# Patient Record
Sex: FEMALE | Race: BLACK OR AFRICAN AMERICAN | Employment: FULL TIME | URBAN - METROPOLITAN AREA
[De-identification: names, ages, dates, MRNs, and addresses within clinical notes are randomized per-mention and may not be internally consistent; named-entity substitution may affect disease eponyms.]

---

## 2021-04-20 ENCOUNTER — RECORDS - HEALTHEAST (OUTPATIENT)
Dept: LAB | Facility: CLINIC | Age: 31
End: 2021-04-20

## 2021-04-20 LAB
SARS-COV-2 PCR COMMENT: NORMAL
SARS-COV-2 RNA SPEC QL NAA+PROBE: NEGATIVE
SARS-COV-2 VIRUS SPECIMEN SOURCE: NORMAL

## 2021-04-29 ENCOUNTER — RECORDS - HEALTHEAST (OUTPATIENT)
Dept: LAB | Facility: CLINIC | Age: 31
End: 2021-04-29

## 2021-05-04 ENCOUNTER — OFFICE VISIT (OUTPATIENT)
Dept: FAMILY MEDICINE | Facility: CLINIC | Age: 31
End: 2021-05-04
Payer: COMMERCIAL

## 2021-05-04 VITALS
TEMPERATURE: 98.3 F | BODY MASS INDEX: 32.39 KG/M2 | DIASTOLIC BLOOD PRESSURE: 74 MMHG | HEIGHT: 62 IN | SYSTOLIC BLOOD PRESSURE: 118 MMHG | WEIGHT: 176 LBS | HEART RATE: 76 BPM | OXYGEN SATURATION: 100 %

## 2021-05-04 DIAGNOSIS — E66.9 EXCESSIVE SUBCUTANEOUS FAT: ICD-10-CM

## 2021-05-04 DIAGNOSIS — D50.8 IRON DEFICIENCY ANEMIA SECONDARY TO INADEQUATE DIETARY IRON INTAKE: ICD-10-CM

## 2021-05-04 DIAGNOSIS — Z01.818 PREOP GENERAL PHYSICAL EXAM: Primary | ICD-10-CM

## 2021-05-04 LAB
ALBUMIN UR-MCNC: NEGATIVE MG/DL
ANION GAP SERPL CALCULATED.3IONS-SCNC: 3 MMOL/L (ref 3–14)
APPEARANCE UR: CLEAR
APTT PPP: 35 SEC (ref 22–37)
BACTERIA #/AREA URNS HPF: ABNORMAL /HPF
BASOPHILS # BLD AUTO: 0.1 10E9/L (ref 0–0.2)
BASOPHILS NFR BLD AUTO: 1 %
BILIRUB UR QL STRIP: NEGATIVE
BUN SERPL-MCNC: 10 MG/DL (ref 7–30)
CALCIUM SERPL-MCNC: 8.7 MG/DL (ref 8.5–10.1)
CHLORIDE SERPL-SCNC: 105 MMOL/L (ref 94–109)
CO2 SERPL-SCNC: 28 MMOL/L (ref 20–32)
COLOR UR AUTO: YELLOW
CREAT SERPL-MCNC: 0.76 MG/DL (ref 0.52–1.04)
DACRYOCYTES BLD QL SMEAR: SLIGHT
DIFFERENTIAL METHOD BLD: ABNORMAL
ERYTHROCYTE [DISTWIDTH] IN BLOOD BY AUTOMATED COUNT: 28.9 % (ref 10–15)
GFR SERPL CREATININE-BSD FRML MDRD: >90 ML/MIN/{1.73_M2}
GLUCOSE SERPL-MCNC: 90 MG/DL (ref 70–99)
GLUCOSE UR STRIP-MCNC: NEGATIVE MG/DL
HCG UR QL: NEGATIVE
HCT VFR BLD AUTO: 28.8 % (ref 35–47)
HGB BLD-MCNC: 9.5 G/DL (ref 11.7–15.7)
HGB UR QL STRIP: NEGATIVE
HYPOCHROMIA BLD QL: PRESENT
INR PPP: 1.07 (ref 0.86–1.14)
KETONES UR STRIP-MCNC: NEGATIVE MG/DL
LEUKOCYTE ESTERASE UR QL STRIP: NEGATIVE
LYMPHOCYTES # BLD AUTO: 2.9 10E9/L (ref 0.8–5.3)
LYMPHOCYTES NFR BLD AUTO: 36 %
MACROCYTES BLD QL SMEAR: PRESENT
MCH RBC QN AUTO: 18.7 PG (ref 26.5–33)
MCHC RBC AUTO-ENTMCNC: 33 G/DL (ref 31.5–36.5)
MCV RBC AUTO: 57 FL (ref 78–100)
MICROCYTES BLD QL SMEAR: PRESENT
MONOCYTES # BLD AUTO: 0.3 10E9/L (ref 0–1.3)
MONOCYTES NFR BLD AUTO: 4 %
NEUTROPHILS # BLD AUTO: 4.7 10E9/L (ref 1.6–8.3)
NEUTROPHILS NFR BLD AUTO: 59 %
NITRATE UR QL: NEGATIVE
NON-SQ EPI CELLS #/AREA URNS LPF: ABNORMAL /LPF
NRBC # BLD AUTO: 0.2 10*3/UL
NRBC BLD AUTO-RTO: 2 /100
OVALOCYTES BLD QL SMEAR: SLIGHT
PH UR STRIP: 6 PH (ref 5–7)
PLATELET # BLD AUTO: 245 10E9/L (ref 150–450)
PLATELET # BLD EST: ABNORMAL 10*3/UL
POLYCHROMASIA BLD QL SMEAR: ABNORMAL
POTASSIUM SERPL-SCNC: 3.7 MMOL/L (ref 3.4–5.3)
RBC # BLD AUTO: 5.09 10E12/L (ref 3.8–5.2)
RBC #/AREA URNS AUTO: ABNORMAL /HPF
RBC INCLUSIONS BLD: SLIGHT
SODIUM SERPL-SCNC: 136 MMOL/L (ref 133–144)
SOURCE: ABNORMAL
SP GR UR STRIP: 1.02 (ref 1–1.03)
TARGETS BLD QL SMEAR: ABNORMAL
UROBILINOGEN UR STRIP-ACNC: 1 EU/DL (ref 0.2–1)
WBC # BLD AUTO: 8 10E9/L (ref 4–11)
WBC #/AREA URNS AUTO: ABNORMAL /HPF

## 2021-05-04 PROCEDURE — 87389 HIV-1 AG W/HIV-1&-2 AB AG IA: CPT | Performed by: PHYSICIAN ASSISTANT

## 2021-05-04 PROCEDURE — 36415 COLL VENOUS BLD VENIPUNCTURE: CPT | Performed by: PHYSICIAN ASSISTANT

## 2021-05-04 PROCEDURE — 85730 THROMBOPLASTIN TIME PARTIAL: CPT | Performed by: PHYSICIAN ASSISTANT

## 2021-05-04 PROCEDURE — 81025 URINE PREGNANCY TEST: CPT | Performed by: PHYSICIAN ASSISTANT

## 2021-05-04 PROCEDURE — 80048 BASIC METABOLIC PNL TOTAL CA: CPT | Performed by: PHYSICIAN ASSISTANT

## 2021-05-04 PROCEDURE — 85610 PROTHROMBIN TIME: CPT | Performed by: PHYSICIAN ASSISTANT

## 2021-05-04 PROCEDURE — 93000 ELECTROCARDIOGRAM COMPLETE: CPT | Performed by: PHYSICIAN ASSISTANT

## 2021-05-04 PROCEDURE — 85025 COMPLETE CBC W/AUTO DIFF WBC: CPT | Performed by: PHYSICIAN ASSISTANT

## 2021-05-04 PROCEDURE — 81001 URINALYSIS AUTO W/SCOPE: CPT | Performed by: PHYSICIAN ASSISTANT

## 2021-05-04 PROCEDURE — 99204 OFFICE O/P NEW MOD 45 MIN: CPT | Performed by: PHYSICIAN ASSISTANT

## 2021-05-04 ASSESSMENT — PAIN SCALES - GENERAL: PAINLEVEL: NO PAIN (0)

## 2021-05-04 ASSESSMENT — MIFFLIN-ST. JEOR: SCORE: 1471.58

## 2021-05-04 NOTE — Clinical Note
Dr Ramos, could you co-sign this.  I will clear the patient only if she can improve Hgb above 10 before 5/28/21. Patient is having an elective liposuction in [place].     Thank you   Katina Mendoza PA-C

## 2021-05-04 NOTE — PATIENT INSTRUCTIONS
At [facility], we strive to deliver an exceptional experience to you, every time we see you. If you receive a survey, please complete it as we do value your feedback.  If you have MyChart, you can expect to receive results automatically within 24 hours of their completion.  Your provider will send a note interpreting your results as well.   If you do not have MyChart, you should receive your results in about a week by mail.    Your care team:                            Family Medicine Internal Medicine   MD Bobby Salcedo MD Shantel Branch-Fleming, MD Srinivasa Vaka, MD Katya Belousova, PAJAVIER Ibrahim, APRN CNP    Kwaku Ramos, MD Pediatrics   Marvin Hatch, PAJAVIER Burgos, CNP MD Miley Vides APRN CNP   MD Deborah Stinson MD Deborah Mielke, MD Emily Mcgraw, APRN [facility]      Clinic hours: Monday - Thursday 7 am-6 pm; Fridays 7 am-5 pm.   Urgent care: Monday - Friday 10 am- 8 pm; Saturday and Sunday 9 am-5 pm.    Clinic: [phone]       [facility]: Monday - Thursday 8 am - 7 pm; Friday 8 am - 6 pm  [facility]: [phone]     Use www.[email or web address].org for 24/7 diagnosis and treatment of dozens of conditions.  Preparing for Your Surgery  Getting started  A nurse will call you to review your health history and instructions. They will give you an arrival time based on your scheduled surgery time.  Please be ready to share the following:    Your doctor's clinic name and phone number    Your medical, surgical and anesthesia history    A list of allergies and sensitivities    A list of medicines, including herbal treatments and over-the-counter drugs    Whether the patient has a legal guardian (ask how to send us the papers in advance)  If you have a child who's having surgery, please ask for a copy of Preparing for Your Child's Surgery.    Preparing for  surgery    Within 30 days of surgery: Have a pre-op exam (sometimes called an H&P, or History and Physical). This can be done at a clinic or pre-operative center.  ? If you're having a , you may not need this exam. Talk to your care team    At your pre-op exam, talk to your care team about all medicines you take. If you need to stop any medicines before surgery, ask when to start taking them again.  ? We do this for your safety. Many medicines can make you bleed too much during surgery. Some change how well surgery (anesthesia) drugs work.    Call your insurance company to let them know you're having surgery. (If you don't have insurance, call [phone].)    Call your clinic if there's any change in your health. This includes signs of a cold or flu (sore throat, runny nose, cough, rash, fever). It also includes a scrape or scratch near the surgery site.    If you have questions on the day of surgery, call your hospital or surgery center.  Eating and drinking guidelines  For your safety: Unless your surgeon tells you otherwise, follow the guidelines below.    Eat and drink as usual until 8 hours before surgery. After that, no food or milk.    Drink clear liquids until 2 hours before surgery. These are liquids you can see through, like water, Gatorade and Propel Water. You may also have black coffee and tea (no cream or milk).    Nothing by mouth within 2 hours of surgery. This includes gum, candy and breath mints.    If you drink, stop drinking alcohol the night before surgery.    If your care team tells you to take medicine on the morning of surgery, it's okay to take it with a sip of water.  Preventing infection    Shower or bathe the night before and morning of your surgery. Follow the instructions your clinic gave you. (If no instructions, use regular soap.)    Don't shave or clip hair near your surgery site. We'll remove the hair if needed.    Don't smoke or vape the morning of surgery. You may chew  nicotine gum up to 2 hours before surgery. A nicotine patch is okay.  ? Note: Some surgeries require you to completely quit smoking and nicotine. Check with your surgeon.    Your care team will make every effort to keep you safe from infection. We will:  ? Clean our hands often with soap and water (or an alcohol-based hand rub).  ? Clean the skin at your surgery site with a special soap that kills germs.  ? Give you a special gown to keep you warm. (Cold raises the risk of infection.)  ? Wear special hair covers, masks, gowns and gloves during surgery.  ? Give antibiotic medicine, if prescribed. Not all surgeries need antibiotics.  What to bring on the day of surgery    Photo ID and insurance card    Copy of your health care directive, if you have one    Glasses and hearing aides (bring cases)  ? You can't wear contacts during surgery    Inhaler and eye drops, if you use them (tell us about these when you arrive)    CPAP machine or breathing device, if you use them    A few personal items, if spending the night    If you have . . .  ? A pacemaker or ICD (cardiac defibrillator): Bring the ID card.  ? An implanted stimulator: Bring the remote control.  ? A legal guardian: Bring a copy of the certified (court-stamped) guardianship papers.  Please remove any jewelry, including body piercings. Leave jewelry and other valuables at home.  If you're going home the day of surgery  Important: If you don't follow the rules below, we must cancel your surgery.     Arrange for someone to drive you home after surgery. You may not drive, take a taxi or take public transportation by yourself (unless you'll have local anesthesia only).    Arrange for a responsible adult to stay with you overnight. If you don't, we may keep you in the hospital overnight, and you may need to pay the costs yourself.  Questions?   If you have any questions for your care team, list them here:  _________________________________________________________________________________________________________________________________________________________________________________________________________________________________________________________________________________________________________________________  For informational purposes only. Not to replace the advice of your health care provider. Copyright   2003, 2019 [facility]. All rights reserved. Clinically reviewed by Kim Brown MD. SMARTworks 160127 - REV 4/20.

## 2021-05-04 NOTE — Clinical Note
Magi, can you fax the pre-op again? Patient called and states that plastic surgery clinic does not have her pre-op, lab and EKG    Thank you     Katina Mendoza PA-C

## 2021-05-04 NOTE — PROGRESS NOTES
[address] [address]  Phone: [phone]  Primary Provider: Geovanna Ref-Primary, Physician  Pre-op Performing Provider: JAYLEN TANNER      PREOPERATIVE EVALUATION:  Today's date: 5/4/2021    Sandra Chavez is a 31 year old female who presents for a preoperative evaluation.    Surgical Information:  Surgery/Procedure: Lipo 360  Surgery Location: [facility], [place], [place]  Surgeon: Dr. Ruddy Lind,   Surgery Date: 6/4/21  Time of Surgery: TBD  Where patient plans to recover: At home with family  Fax number for surgical facility: [phone]    Type of Anesthesia Anticipated: General    Assessment & Plan     The proposed surgical procedure is considered INTERMEDIATE risk.    Problem List Items Addressed This Visit        Hematologic    Iron deficiency anemia secondary to inadequate dietary iron intake    Relevant Medications    ferrous sulfate (FEROSUL) 325 (65 Fe) MG tablet    Other Relevant Orders    CBC with platelets (Completed)      Other Visit Diagnoses     Preop general physical exam    -  Primary    Relevant Orders    Basic metabolic panel  (Ca, Cl, CO2, Creat, Gluc, K, Na, BUN) (Completed)    HCG Qual, Urine ([id number]) (Completed)    HIV Antigen Antibody Combo (Completed)    INR (Completed)    Partial thromboplastin time (Completed)    UA with Microscopic (Completed)    EKG 12-lead complete w/read - Clinics    CBC with platelets and differential (Completed)    Excessive subcutaneous fat               Risks and Recommendations:  The patient has the following additional risks and recommendations for perioperative complications:   - No identified additional risk factors other than previously addressed    Medication Instructions:  Patient is on no chronic medications        RECOMMENDATION:  Surgery is NOT recommended due to moderate worsening anemia that must be corrected prior to elective surgery. Anemia treatment plan:  Iron 325 mg three times a day for 1-2 months until HGB is back to normal ranges.     25 minutes spent on the date of the encounter doing chart review, history and exam, documentation and further activities per the note        Subjective     HPI related to upcoming procedure: liposuction of the abdomen, and flanks       Preop Questions 5/4/2021   1. Have you ever had a heart attack or stroke? No   2. Have you ever had surgery on your heart or blood vessels, such as a stent placement, a coronary artery bypass, or surgery on an artery in your head, neck, heart, or legs? No   3. Do you have chest pain with activity? No   4. Do you have a history of  heart failure? No   5. Do you currently have a cold, bronchitis or symptoms of other infection? No   6. Do you have a cough, shortness of breath, or wheezing? No   7. Do you or anyone in your family have previous history of blood clots? No   8. Do you or does anyone in your family have a serious bleeding problem such as prolonged bleeding following surgeries or cuts? No   9. Have you ever had problems with anemia or been told to take iron pills? No   10. Have you had any abnormal blood loss such as black, tarry or bloody stools, or abnormal vaginal bleeding? No   11. Have you ever had a blood transfusion? No   12. Are you willing to have a blood transfusion if it is medically needed before, during, or after your surgery? Yes   13. Have you or any of your relatives ever had problems with anesthesia? No   14. Do you have sleep apnea, excessive snoring or daytime drowsiness? No   15. Do you have any artifical heart valves or other implanted medical devices like a pacemaker, defibrillator, or continuous glucose monitor? No   16. Do you have artificial joints? No   17. Are you allergic to latex? No   18. Is there any chance that you may be pregnant? No     Health Care Directive:  Patient does not have a Health Care Directive or Living Will: Discussed advance care planning with  "patient; however, patient declined at this time. Full code    Preoperative Review of :   reviewed - no record of controlled substances prescribed.      Status of Chronic Conditions:  See problem list for active medical problems.  Problems all longstanding and stable, except as noted/documented.  See ROS for pertinent symptoms related to these conditions.      Review of Systems  Constitutional, neuro, ENT, endocrine, pulmonary, cardiac, gastrointestinal, genitourinary, musculoskeletal, integument and psychiatric systems are negative, except as otherwise noted.    Patient Active Problem List    Diagnosis Date Noted     Iron deficiency anemia secondary to inadequate dietary iron intake 05/06/2021     Priority: Medium      History reviewed. No pertinent past medical history.  History reviewed. No pertinent surgical history.  Current Outpatient Medications   Medication Sig Dispense Refill     ferrous sulfate (FEROSUL) 325 (65 Fe) MG tablet Take 1 tablet (325 mg) by mouth 3 times daily (with meals) 180 tablet 1     levonorgestrel (MIRENA) 20 MCG/24HR IUD 1 each by Intrauterine route once         No Known Allergies     Social History     Tobacco Use     Smoking status: Never Smoker     Smokeless tobacco: Never Used   Substance Use Topics     Alcohol use: Not on file     History reviewed. No pertinent family history.  History   Drug Use Unknown         Objective     /74 (BP Location: Left arm, Patient Position: Sitting, Cuff Size: Adult Regular)   Pulse 76   Temp 98.3  F (36.8  C) (Tympanic)   Ht 1.575 m (5' 2\")   Wt 79.8 kg (176 lb)   LMP 04/10/2021 (Exact Date)   SpO2 100%   BMI 32.19 kg/m      Physical Exam    GENERAL APPEARANCE: healthy, alert and no distress     EYES: EOMI, PERRL     HENT: ear canals and TM's normal and nose and mouth without ulcers or lesions     NECK: no adenopathy, no asymmetry, masses, or scars and thyroid normal to palpation     RESP: lungs clear to auscultation - no rales, " rhonchi or wheezes     CV: regular rates and rhythm, normal S1 S2, no S3 or S4 and no murmur, click or rub     ABDOMEN:  soft, nontender, no HSM or masses and bowel sounds normal     MS: extremities normal- no gross deformities noted, no evidence of inflammation in joints, FROM in all extremities.     SKIN: no suspicious lesions or rashes     NEURO: Normal strength and tone, sensory exam grossly normal, mentation intact and speech normal     PSYCH: mentation appears normal. and affect normal/bright     LYMPHATICS: No cervical adenopathy    No results for input(s): HGB, PLT, INR, NA, POTASSIUM, CR, A1C in the last 57710 hours.     Diagnostics:  Diagnostic Test Results:  Results for orders placed or performed in visit on 05/04/21   Basic metabolic panel  (Ca, Cl, CO2, Creat, Gluc, K, Na, BUN)     Status: None   Result Value Ref Range    Sodium 136 133 - 144 mmol/L    Potassium 3.7 3.4 - 5.3 mmol/L    Chloride 105 94 - 109 mmol/L    Carbon Dioxide 28 20 - 32 mmol/L    Anion Gap 3 3 - 14 mmol/L    Glucose 90 70 - 99 mg/dL    Urea Nitrogen 10 7 - 30 mg/dL    Creatinine 0.76 0.52 - 1.04 mg/dL    GFR Estimate >90 >60 mL/min/[1.73_m2]    GFR Estimate If Black >90 >60 mL/min/[1.73_m2]    Calcium 8.7 8.5 - 10.1 mg/dL   HCG Qual, Urine ([id number])     Status: None   Result Value Ref Range    HCG Qual Urine Negative NEG^Negative   HIV Antigen Antibody Combo     Status: None   Result Value Ref Range    HIV Antigen Antibody Combo Nonreactive NR^Nonreactive       INR     Status: None   Result Value Ref Range    INR 1.07 0.86 - 1.14   Partial thromboplastin time     Status: None   Result Value Ref Range    PTT 35 22 - 37 sec   UA with Microscopic     Status: Abnormal   Result Value Ref Range    Color Urine Yellow     Appearance Urine Clear     Glucose Urine Negative NEG^Negative mg/dL    Bilirubin Urine Negative NEG^Negative    Ketones Urine Negative NEG^Negative mg/dL    Specific Gravity Urine 1.025 1.003 - 1.035    pH Urine 6.0 5.0  - 7.0 pH    Protein Albumin Urine Negative NEG^Negative mg/dL    Urobilinogen Urine 1.0 0.2 - 1.0 EU/dL    Nitrite Urine Negative NEG^Negative    Blood Urine Negative NEG^Negative    Leukocyte Esterase Urine Negative NEG^Negative    Source Midstream Urine     WBC Urine 0 - 5 OTO5^0 - 5 /HPF    RBC Urine O - 2 OTO2^O - 2 /HPF    Squamous Epithelial /LPF Urine Few FEW^Few /LPF    Bacteria Urine Moderate (A) NEG^Negative /HPF   CBC with platelets and differential     Status: Abnormal   Result Value Ref Range    WBC 8.0 4.0 - 11.0 10e9/L    RBC Count 5.09 3.8 - 5.2 10e12/L    Hemoglobin 9.5 (L) 11.7 - 15.7 g/dL    Hematocrit 28.8 (L) 35.0 - 47.0 %    MCV 57 (L) 78 - 100 fl    MCH 18.7 (L) 26.5 - 33.0 pg    MCHC 33.0 31.5 - 36.5 g/dL    RDW 28.9 (H) 10.0 - 15.0 %    Platelet Count 245 150 - 450 10e9/L    % Neutrophils 59.0 %    % Lymphocytes 36.0 %    % Monocytes 4.0 %    % Basophils 1.0 %    Nucleated RBCs 2 (H) 0 /100    Absolute Neutrophil 4.7 1.6 - 8.3 10e9/L    Absolute Lymphocytes 2.9 0.8 - 5.3 10e9/L    Absolute Monocytes 0.3 0.0 - 1.3 10e9/L    Absolute Basophils 0.1 0.0 - 0.2 10e9/L    Absolute Nucleated RBC 0.2     Polychromasia Slight Increase     RBC Fragments Slight     Teardrop Cells Slight     Target Cells Moderate     Ovalocytes Slight     Microcytes Present     Macrocytes Present     Hypochromasia Present     Platelet Estimate       Automated count confirmed.  Platelet morphology is normal.    Diff Method Manual Differential       CBC : 05/28/2021  Lab Results   Component Value Date    WBC 9.9 05/28/2021     Lab Results   Component Value Date    RBC 4.89 05/28/2021     Lab Results   Component Value Date    HGB 9.3 05/28/2021     Lab Results   Component Value Date    HCT 27.7 05/28/2021     No components found for: MCT  Lab Results   Component Value Date    MCV 57 05/28/2021     Lab Results   Component Value Date    MCH 19.0 05/28/2021     Lab Results   Component Value Date    MCHC 33.6 05/28/2021     Lab  Results   Component Value Date    RDW 29.5 05/28/2021     Lab Results   Component Value Date     05/28/2021       EKG: appears normal, NSR, normal axis, normal intervals, no acute ST/T changes c/w ischemia, no LVH by voltage criteria, unchanged from previous tracings    Revised Cardiac Risk Index (RCRI):  The patient has the following serious cardiovascular risks for perioperative complications:   - No serious cardiac risks = 0 points     RCRI Interpretation: 0 points: Class I (very low risk - 0.4% complication rate)           Signed Electronically by: Katina Mendoza PA-C  Reviewed and agree with assessment and plan above.  Kwaku Ramos MD    Copy of this evaluation report is provided to requesting physician.

## 2021-05-04 NOTE — Clinical Note
Dr. Galvez, please co-sign this pre-op.  Im clearing the patient only she can get HGB above 10 before May 28th, otherwise she would have to postpone the surgery    Thank you     Katina Mendoza PA-C

## 2021-05-05 LAB — HIV 1+2 AB+HIV1 P24 AG SERPL QL IA: NONREACTIVE

## 2021-05-06 ENCOUNTER — RECORDS - HEALTHEAST (OUTPATIENT)
Dept: LAB | Facility: CLINIC | Age: 31
End: 2021-05-06

## 2021-05-06 PROBLEM — D50.8 IRON DEFICIENCY ANEMIA SECONDARY TO INADEQUATE DIETARY IRON INTAKE: Status: ACTIVE | Noted: 2021-05-06

## 2021-05-06 RX ORDER — FERROUS SULFATE 325(65) MG
325 TABLET ORAL
Qty: 180 TABLET | Refills: 1 | Status: SHIPPED | OUTPATIENT
Start: 2021-05-06 | End: 2022-02-23

## 2021-05-10 ENCOUNTER — RECORDS - HEALTHEAST (OUTPATIENT)
Dept: LAB | Facility: CLINIC | Age: 31
End: 2021-05-10

## 2021-05-13 ENCOUNTER — TELEPHONE (OUTPATIENT)
Dept: FAMILY MEDICINE | Facility: CLINIC | Age: 31
End: 2021-05-13

## 2021-05-13 ENCOUNTER — RECORDS - HEALTHEAST (OUTPATIENT)
Dept: LAB | Facility: CLINIC | Age: 31
End: 2021-05-13

## 2021-05-13 ENCOUNTER — MYC MEDICAL ADVICE (OUTPATIENT)
Dept: FAMILY MEDICINE | Facility: CLINIC | Age: 31
End: 2021-05-13

## 2021-05-13 ENCOUNTER — VIRTUAL VISIT (OUTPATIENT)
Dept: FAMILY MEDICINE | Facility: CLINIC | Age: 31
End: 2021-05-13
Payer: COMMERCIAL

## 2021-05-13 DIAGNOSIS — Z53.9 ERRONEOUS ENCOUNTER--DISREGARD: Primary | ICD-10-CM

## 2021-05-13 NOTE — TELEPHONE ENCOUNTER
Please fax patient preop labs to facility ASAP, labs done on 5/4/21. [facility], [place], [place], fax: [phone]. Patient would also like a copy left at the  for her to . Em Crews -

## 2021-05-13 NOTE — PROGRESS NOTES
"Patient called to ask if pre-op was faxed. Her plastic surgery clinic in [place] did not get it yet.   Patient also wants to get prescription  for sone \"urination pills\" her plastic surgeon told her that she will need to urinate a lot after the surgery.     Pre-op was faxed on 5/6/21. I will ask to fax it again patient needs to call [place] and confirm the fax number.   Any medication that is needed for after the surgery a plastic surgeon should prescribe. Patient will follow up with them    Katina Mendoza PA-C   "

## 2021-05-13 NOTE — TELEPHONE ENCOUNTER
Pre-op, lab and EKG printed and faxed for the 2nd time for patient.  Elias Brannon,  For [facility]

## 2021-05-13 NOTE — PROGRESS NOTES
Pre-op, labs and EKG is printed and faxed for the 2nd time.  Elias Brannon,  For [facility]

## 2021-05-13 NOTE — TELEPHONE ENCOUNTER
Pre-op, lab and EKG printed and faxed again for the 2nd time, copy is put at the  for patient to pickup this after noon.  Elias Brannon,  For [facility]    Sent my chart message to patient to pickup a copy today after noon.  Elias Brannon,  For [facility]

## 2021-05-18 ENCOUNTER — RECORDS - HEALTHEAST (OUTPATIENT)
Dept: LAB | Facility: CLINIC | Age: 31
End: 2021-05-18

## 2021-05-25 ENCOUNTER — RECORDS - HEALTHEAST (OUTPATIENT)
Dept: LAB | Facility: CLINIC | Age: 31
End: 2021-05-25

## 2021-05-28 DIAGNOSIS — D50.8 IRON DEFICIENCY ANEMIA SECONDARY TO INADEQUATE DIETARY IRON INTAKE: ICD-10-CM

## 2021-05-28 LAB
ERYTHROCYTE [DISTWIDTH] IN BLOOD BY AUTOMATED COUNT: 29.5 % (ref 10–15)
HCT VFR BLD AUTO: 27.7 % (ref 35–47)
HGB BLD-MCNC: 9.3 G/DL (ref 11.7–15.7)
MCH RBC QN AUTO: 19 PG (ref 26.5–33)
MCHC RBC AUTO-ENTMCNC: 33.6 G/DL (ref 31.5–36.5)
MCV RBC AUTO: 57 FL (ref 78–100)
PLATELET # BLD AUTO: 339 10E9/L (ref 150–450)
RBC # BLD AUTO: 4.89 10E12/L (ref 3.8–5.2)
WBC # BLD AUTO: 9.9 10E9/L (ref 4–11)

## 2021-05-28 PROCEDURE — 36415 COLL VENOUS BLD VENIPUNCTURE: CPT | Performed by: PHYSICIAN ASSISTANT

## 2021-05-28 PROCEDURE — 85027 COMPLETE CBC AUTOMATED: CPT | Performed by: PHYSICIAN ASSISTANT

## 2021-06-01 ENCOUNTER — TELEPHONE (OUTPATIENT)
Dept: FAMILY MEDICINE | Facility: CLINIC | Age: 31
End: 2021-06-01

## 2021-06-01 NOTE — TELEPHONE ENCOUNTER
Patient is looking for the results of the 5/28/21 labwork.  She states needs the results and needs them sent to [facility] [place], [place], [place] at Fax [phone].  Has surgery scheduled for 6/4/21.  She states had to have the labwork redone because her iron was too low.  Provider has not yet reviewed.  Sending this encounter to provider to advise please.  Emily Feliciano RN

## 2021-06-01 NOTE — TELEPHONE ENCOUNTER
Provider:   1. Please review and advise regarding final lab results.   2. Please route back to  to fax results to fax # [phone] with plan.    Pt is calling back again stating that the [facility] still needs the results, but if her Hgb is less than 11 she believes her surgery will be cancelled. Pt inquires if her Hgb is less than 11. RN advised pt that her Hgb is 9.3, but still awaiting provider to review and advise regarding the results. RN advised there is no plan from provider documented on the result notes so pt will be notified of all results and plan when reviewed by provider. Pt agrees and will await call back.    CESIA AbadN, RN

## 2021-06-02 NOTE — TELEPHONE ENCOUNTER
Provider: She reports the she has a Mirena and does not have heavy periods.  She is taking the iron 325 mg 3 times a day.  When and how would you like her to follow up on the Hgb?  Thank you. Kaia Gonzalez R.N.    This refill/encounter is being handled by a team outside your facility.  If action needs to be taken, please route the encounter back to your team that would normally handle it. Please do not send directly back to the sender. Thank you. Kaia Gonzalez R.N.  Patient notified of provider's message as written below and she will cancel her surgery. She reports the she has a Mirena and does not have heavy periods.  She is taking the iron 325 mg 3 times a day.   Ok to leave a message with the provider's response.

## 2021-06-02 NOTE — TELEPHONE ENCOUNTER
Left message on voicemail for Sandra letting her know her lab result were abnormal; provider cannot ok her for her surgery at this time.  Left call back number for patient to call with rest of instructions.  [phone]/  Emily TIWARI RN    When patient returns call please relay this message from her provider:  Please call the patient. The HGB has gotten worse. Its possible that she bleeds heavily during period. Also has she been taking iron pills three times a day as was advised. I will not clear her for cosmetic surgery until HGB is back to normal ranges.  If patient has more questions, she may schedule a phone visit.   Thank you      Katina Mendoza PA-C

## 2021-06-02 NOTE — PROGRESS NOTES
Faxed updated pre-op information per Katina.  Elias Brannon (AKA:  Magi), , [facility], Primary Care

## 2021-06-02 NOTE — TELEPHONE ENCOUNTER
Please call the patient. The HGB has gotten worse. Its possible that she bleeds heavily during period. Also has she need taking iron pills three times a day as was advised. I will not clear her for cosmetic surgery until HGB is back to normal ranges.  If patient has more questions, she may schedule a phone visit.   Thank you     Katina Mendoza PA-C

## 2021-06-03 ENCOUNTER — RECORDS - HEALTHEAST (OUTPATIENT)
Dept: LAB | Facility: CLINIC | Age: 31
End: 2021-06-03

## 2021-06-03 NOTE — TELEPHONE ENCOUNTER
I have talked to the patient yesterday and she did not start iron immediately and does not take it regularly. I already instructed her to take three times a day every single day with food.     Repeat lab in 3 weeks.     Katina Mendoza PA-C

## 2021-06-03 NOTE — TELEPHONE ENCOUNTER
Gave message as written below, scheduled lab appointment 3 weeks    Luba Mitchell RN, BSN, CMSRN  [facility]

## 2021-06-08 ENCOUNTER — RECORDS - HEALTHEAST (OUTPATIENT)
Dept: LAB | Facility: CLINIC | Age: 31
End: 2021-06-08

## 2021-06-08 ENCOUNTER — TELEPHONE (OUTPATIENT)
Dept: FAMILY MEDICINE | Facility: CLINIC | Age: 31
End: 2021-06-08

## 2021-06-15 ENCOUNTER — OFFICE VISIT (OUTPATIENT)
Dept: FAMILY MEDICINE | Facility: CLINIC | Age: 31
End: 2021-06-15
Payer: COMMERCIAL

## 2021-06-15 ENCOUNTER — RECORDS - HEALTHEAST (OUTPATIENT)
Dept: LAB | Facility: CLINIC | Age: 31
End: 2021-06-15

## 2021-06-15 VITALS
WEIGHT: 179.2 LBS | SYSTOLIC BLOOD PRESSURE: 112 MMHG | HEART RATE: 92 BPM | DIASTOLIC BLOOD PRESSURE: 75 MMHG | TEMPERATURE: 98 F | OXYGEN SATURATION: 100 % | BODY MASS INDEX: 32.78 KG/M2

## 2021-06-15 DIAGNOSIS — Z11.59 NEED FOR HEPATITIS C SCREENING TEST: ICD-10-CM

## 2021-06-15 DIAGNOSIS — Z00.00 ROUTINE HISTORY AND PHYSICAL EXAMINATION OF ADULT: Primary | ICD-10-CM

## 2021-06-15 DIAGNOSIS — D50.8 IRON DEFICIENCY ANEMIA SECONDARY TO INADEQUATE DIETARY IRON INTAKE: ICD-10-CM

## 2021-06-15 DIAGNOSIS — Z12.4 SCREENING FOR CERVICAL CANCER: ICD-10-CM

## 2021-06-15 DIAGNOSIS — Z11.3 SCREEN FOR STD (SEXUALLY TRANSMITTED DISEASE): ICD-10-CM

## 2021-06-15 DIAGNOSIS — T83.32XA INTRAUTERINE CONTRACEPTIVE DEVICE THREADS LOST, INITIAL ENCOUNTER: ICD-10-CM

## 2021-06-15 LAB
SARS-COV-2 PCR COMMENT: NORMAL
SARS-COV-2 RNA SPEC QL NAA+PROBE: NEGATIVE
SARS-COV-2 VIRUS SPECIMEN SOURCE: NORMAL
SPECIMEN SOURCE: NORMAL
WET PREP SPEC: NORMAL

## 2021-06-15 PROCEDURE — 99213 OFFICE O/P EST LOW 20 MIN: CPT | Mod: 25 | Performed by: NURSE PRACTITIONER

## 2021-06-15 PROCEDURE — 87624 HPV HI-RISK TYP POOLED RSLT: CPT | Performed by: NURSE PRACTITIONER

## 2021-06-15 PROCEDURE — 87491 CHLMYD TRACH DNA AMP PROBE: CPT | Performed by: NURSE PRACTITIONER

## 2021-06-15 PROCEDURE — 87591 N.GONORRHOEAE DNA AMP PROB: CPT | Performed by: NURSE PRACTITIONER

## 2021-06-15 PROCEDURE — 87210 SMEAR WET MOUNT SALINE/INK: CPT | Performed by: NURSE PRACTITIONER

## 2021-06-15 PROCEDURE — 99395 PREV VISIT EST AGE 18-39: CPT | Performed by: NURSE PRACTITIONER

## 2021-06-15 PROCEDURE — G0145 SCR C/V CYTO,THINLAYER,RESCR: HCPCS | Performed by: NURSE PRACTITIONER

## 2021-06-15 ASSESSMENT — PAIN SCALES - GENERAL: PAINLEVEL: NO PAIN (0)

## 2021-06-15 NOTE — PROGRESS NOTES
"    Assessment & Plan     Routine history and physical examination of adult      Screening for cervical cancer    - HPV High Risk Types DNA Cervical  - Pap imaged thin layer screen with HPV - recommended age 30 - 65 years (select HPV order below)    Intrauterine contraceptive device threads lost, initial encounter  Referring to GYN for removal of IUD.  - OB/GYN REFERRAL    Iron deficiency anemia secondary to inadequate dietary iron intake  Hgb 9.3 5/28/21, advised her to continue to take her iron supplement, recheck  CBC and iron as scheduled    Need for hepatitis C screening test    - Hepatitis C Screen Reflex to HCV RNA Quant and Genotype    Screen for STD (sexually transmitted disease)  Always use condoms to help prevent STI's.  - Wet prep  - NEISSERIA GONORRHOEA PCR  - CHLAMYDIA TRACHOMATIS PCR      }     BMI:   Estimated body mass index is 32.78 kg/m  as calculated from the following:    Height as of 5/4/21: 1.575 m (5' 2\").    Weight as of this encounter: 81.3 kg (179 lb 3.2 oz).   Weight management plan: Discussed healthy diet and exercise guidelines    Work on weight loss  Regular exercise  See Patient Instructions    Return in about 6 months (around 12/15/2021), or if symptoms worsen or fail to improve.    MAHAMED Desai CNP  [facility] BIGG Flores is a 31 year old who presents for the following health issues   HPI     Concern - IUD removal  has had for 4 years and now wants to attempt to conceive, no STI history, LAST MENSTUAL PERIOD 5/10/21.     Due for pap today, no history of abnomral paps.    Annual Wellness Visit    Patient has been advised of split billing requirements and indicates understanding: Yes     Are you in the first 12 months of your Medicare Part B coverage?  No    Physical Health:    In general, how would you rate your overall physical health? good    Outside of work, how many days during the week do you exercise?2-3 days/week    Outside of " "work, approximately how many minutes a day do you exercise?30-45 minutes    If you drink alcohol do you typically have >3 drinks per day or >7 drinks per week? No    Do you usually eat at least 4 servings of fruit and vegetables a day, include whole grains & fiber and avoid regularly eating high fat or \"junk\" foods? Yes    Do you have any problems taking medications regularly? No    Do you have any side effects from medications? none    Needs assistance for the following daily activities: no assistance needed    Which of the following safety concerns are present in your home?  none identified     Hearing impairment: No    In the past 6 months, have you been bothered by leaking of urine? no    Mental Health:    In general, how would you rate your overall mental or emotional health? good  PHQ-2 Score: 0    Do you feel safe in your environment? Yes    Have you ever done Advance Care Planning? (For example, a Health Directive, POLST, or a discussion with a medical provider or your loved ones about your wishes)? No, advance care planning information given to patient to review.  Patient declined advance care planning discussion at this time.    Fall risk:  Fall Risk Assessment not completed.  click delete button to remove this line now      Do you have sleep apnea, excessive snoring or daytime drowsiness?: no    Current providers sharing in care for this patient include:   Patient Care Team:  Katina Mendoza PA-C as PCP - General (Family Medicine)  Katina Mendoza PA-C as Assigned PCP  Ron Gregory MD as Assigned OBGYN Provider    Patient has been advised of split billing requirements and indicates understanding: Yes    Review of Systems   Constitutional, HEENT, cardiovascular, pulmonary, gi and gu systems are negative, except as otherwise noted.      Objective    /75 (BP Location: Left arm, Patient Position: Sitting, Cuff Size: Adult Large)   Pulse 92   Temp 98  F (36.7  C) (Tympanic)   Wt " 81.3 kg (179 lb 3.2 oz)   SpO2 100%   BMI 32.78 kg/m    Body mass index is 32.78 kg/m .  Physical Exam   GENERAL: healthy, alert and no distress  EYES: Eyes grossly normal to inspection, PERRL and conjunctivae and sclerae normal  HENT: ear canals and TM's normal, nose and mouth without ulcers or lesions  NECK: no adenopathy, no asymmetry, masses, or scars and thyroid normal to palpation  RESP: lungs clear to auscultation - no rales, rhonchi or wheezes  CV: regular rate and rhythm, normal S1 S2, no S3 or S4, no murmur, click or rub, no peripheral edema and peripheral pulses strong  ABDOMEN: soft, nontender, no hepatosplenomegaly, no masses and bowel sounds normal   (female): normal female external genitalia, normal urethral meatus, vaginal mucosa, normal cervix/adnexa/uterus without masses or discharge, pap,. Wet prep, GC obtained, IUD strings not visible in os   MS: no gross musculoskeletal defects noted, no edema  SKIN: no suspicious lesions or rashes  NEURO: Normal strength and tone, mentation intact and speech normal  BACK: no CVA tenderness, no paralumbar tenderness  PSYCH: mentation appears normal, affect normal/bright  LYMPH: no cervical, supraclavicular, axillary, or inguinal adenopathy    Results for orders placed or performed in visit on 06/15/21   HPV High Risk Types DNA Cervical     Status: None   Result Value Ref Range    HPV Source SurePath     HPV 16 DNA Negative NEG^Negative    HPV 18 DNA Negative NEG^Negative    Other HR HPV Negative NEG^Negative    Final Diagnosis This patient's sample is negative for HPV DNA.     Specimen Description Cervical Cells    Pap imaged thin layer screen with HPV - recommended age 30 - 65 years (select HPV order below)     Status: None   Result Value Ref Range    PAP NIL     Copath Report         Patient Name: TRESSA SIMMONS  MR#: [id number]  Specimen #: [id number]  Collected: 6/15/2021  Received: 6/16/2021  Reported: 6/18/2021 11:45  Ordering Phy(s): LUCÍA RICK  THEIN    For improved result formatting, select 'View Enhanced Report Format' under   Linked Documents section.    SPECIMEN/STAIN PROCESS:  Pap imaged thin layer prep screening (Surepath, FocalPoint with guided   screening)       Pap-Cyto x 1, HPV ordered x 1    SOURCE: Cervical, endocervical  ----------------------------------------------------------------   Pap imaged thin layer prep screening (Surepath, FocalPoint with guided   screening)  SPECIMEN ADEQUACY:  Satisfactory for evaluation.  -Transformation zone component present.    CYTOLOGIC INTERPRETATION:    Negative for intraepithelial lesion or malignancy    Electronically signed out by:  ALEC Mota (ASCP)    CLINICAL HISTORY:    Papanicolaou Test Limitations:  Cervical cytology is a screening test with   limited sensitivity; regular  screening is critic al for cancer prevention; Pap tests are primarily   effective for the diagnosis/prevention of  squamous cell carcinoma, not adenocarcinomas or other cancers.    COLLECTION SITE:  Client:  [facility]  Location: [facility] (B)    The technical component of this testing was completed at the [facility], with the professional component performed   at the [facility], [address] [place] [phone] ([phone])       Wet prep     Status: None    Specimen: Vagina   Result Value Ref Range    Specimen Description Vagina     Wet Prep Scant material seen on sample submitted     Wet Prep No Trichomonas seen     Wet Prep No clue cells seen     Wet Prep No yeast seen     Wet Prep No WBC's seen    NEISSERIA GONORRHOEA PCR     Status: None    Specimen: Endocervical   Result Value Ref Range    Specimen Descrip Endocervical     N Gonorrhea PCR Negative NEG^Negative   CHLAMYDIA TRACHOMATIS PCR     Status: None    Specimen: Endocervical   Result Value Ref  Range    Specimen Description Endocervical     Chlamydia Trachomatis PCR Negative NEG^Negative

## 2021-06-15 NOTE — PATIENT INSTRUCTIONS
At [facility], we strive to deliver an exceptional experience to you, every time we see you. If you receive a survey, please complete it as we do value your feedback.  If you have MyChart, you can expect to receive results automatically within 24 hours of their completion.  Your provider will send a note interpreting your results as well.   If you do not have MyChart, you should receive your results in about a week by mail.    Your care team:                            Family Medicine Internal Medicine   MD Bobby Salcedo MD Shantel Branch-Fleming, MD Srinivasa Vaka, MD Katya Belousova, PAJAVIER Ibrahim, APRN CNP    Kwaku Ramos, MD Pediatrics   Marvin Hatch, PAJAVIER Burgos, CNP MD Miley Vides APRN CNP   MD Deborah Stinson MD Deborah Mielke, MD Kim Thein, APRN [facility]      Clinic hours: Monday - Thursday 7 am-6 pm; Fridays 7 am-5 pm.   Urgent care: Monday - Friday 10 am- 8 pm; Saturday and Sunday 9 am-5 pm.    Clinic: [phone]       [facility]: Monday - Thursday 8 am - 7 pm; Friday 8 am - 6 pm  [facility]: [phone]     Use www.oncare.org for 24/7 diagnosis and treatment of dozens of conditions.    Patient Education     Prevention Guidelines, Women Ages 18 to 39  Screening tests and vaccines are an important part of managing your health. A screening test is done to find possible disorders or diseases in people who don't have any symptoms. The goal is to find a disease early so lifestyle changes can be made and you can be watched more closely to reduce the risk of disease, or to detect it early enough to treat it most effectively. Screening tests are not considered diagnostic, but are used to determine if more testing is needed. Health counseling is essential, too. Below are guidelines for these, for women ages 18 to 39. Talk with your healthcare  provider to make sure you re up-to-date on what you need.   Screening Who needs it How often   Alcohol misuse All women in this age group  At routine exams   Blood pressure All women in this age group  Yearly checkup if your blood pressure is normal   Normal blood pressure is less than 120/80 mm Hg   If your blood pressure reading is higher than normal, follow the advice of your healthcare provider    Breast cancer All women in this age group should talk with their healthcare providers about the need for clinical breast exams (CBE)1  Clinical breast exam every 3 years1    Cervical cancer Women ages 21 and older  Women between ages 21 and 29 should have a Pap test every 3 years; women between ages 30 and 65 are advised to have a Pap test plus an HPV test every 5 years    Chlamydia Sexually active women ages 24 and younger, and women at increased risk for infection  Every 3 years if you're at risk or have symptoms    Depression All women in this age group  At routine exams   Diabetes mellitus, type 2  Adults with no symptoms who are overweight or obese and have 1 or more other risk factors for diabetes  At least every 3 years. Also, testing for diabetes during pregnancy after the 24th week.     Gonorrhea Sexually active women at increased risk for infection  At routine exams   Hepatitis C Anyone at increased risk  At routine exams   HIV All women At routine exams3     Obesity All women in this age group  At routine exams   Syphilis Women at increased risk for infection should talk with their healthcare provider  At routine exams   Tuberculosis Women at increased risk for infection should talk with their healthcare provider  Ask your healthcare provider    Vision All women in this age group  At least 1 complete exam in your 20s, and 2 in your 30s    Vaccine Who needs it How often   Chickenpox (varicella)  All women in this age group who have no record of this infection or vaccine  2 doses; the second dose should be  given 4 to 8 weeks after the first dose    Hepatitis A Women at increased risk for infection should talk with their healthcare provider  2 doses given at least 6 months apart    Hepatitis B Women at increased risk for infection should talk with their healthcare provider  3 doses over 6 months; second dose should be given 1 month after the first dose; the third dose should be given at least 2 months after the second dose and at least 4 months after the first dose    Haemophilus influenzae  Type B (HIB)  Women at increased risk for infection should talk with their healthcare provider  1 to 3 doses   Human papillomavirus (HPV)  All women in this age group up to age 26  3 doses; the second dose should be given 1 to 2 months after the first dose and the third dose given 6 months after the first dose    Influenza (flu) All women in this age group  Once a year   Measles, mumps, rubella (MMR)  All women in this age group who have no record of these infections or vaccines  1 or 2 doses   Meningococcal Women at increased risk for infection should talk with their healthcare provider  1 or more doses   Pneumococcal conjugate vaccine (PCV13) and pneumococcal polysaccharide vaccine (PPSV23)  Women at increased risk for infection should talk with their healthcare provider  PCV13: 1 dose ages 19 to 65 (protects against 13 types of pneumococcal bacteria)   PPSV23: 1 to 2 doses through age 64, or 1 dose at 65 or older (protects against 23 types of pneumococcal bacteria)    Tetanus/diphtheria/pertussis (Td/Tdap) booster All women in this age group  Td every 10 years, or a one-time dose of Tdap instead of a Td booster after age 18, then Td every 10 years    Counseling Who needs it How often   BRCA gene mutation testing for breast and ovarian cancer susceptibility  Women with increased risk for having gene mutation  When your risk is known    Breast cancer and chemoprevention  Women at high risk for breast cancer  When your risk is  known    Diet and exercise Women who are overweight or obese  When diagnosed, and then at routine exams    Domestic violence Women at the age in which they are able to have children  At routine exams   Sexually transmitted infection prevention  Women who are sexually active  At routine exams   Skin cancer Prevention of skin cancer in fair-skinned adults  At routine exams   Use of tobacco and the health effects it can cause  All women in this age group  Every visit   1 According to the ACS, women ages 20 to 39 years should have a clinical breast exam (CBE) as part of their routine health exam every 3 years. Breast self-exams are an option for women starting in their 20s. But the U.S. Preventive Services Task Force (USPSTF) does not recommend CBE.   2 Those who are 18 years old and not up-to-date on their childhood vaccines should get all appropriate catch-up vaccines recommended by the CDC.   3 The USPSTF recommends that all people ages 15 to 65 years be screened for HIV and those younger or older people at increased risk. The CDC recommends that everyone between the ages of 13 and 64 get tested for HIV at least once as part of routine health care.   [employer] last reviewed this educational content on 10/1/2017    9545-2952 [employer]. All rights reserved. This information is not intended as a substitute for professional medical care. Always follow your healthcare professional's instructions.

## 2021-06-16 LAB
C TRACH DNA SPEC QL NAA+PROBE: NEGATIVE
N GONORRHOEA DNA SPEC QL NAA+PROBE: NEGATIVE
SPECIMEN SOURCE: NORMAL
SPECIMEN SOURCE: NORMAL

## 2021-06-18 LAB
COPATH REPORT: NORMAL
PAP: NORMAL

## 2021-06-22 LAB
FINAL DIAGNOSIS: NORMAL
HPV HR 12 DNA CVX QL NAA+PROBE: NEGATIVE
HPV16 DNA SPEC QL NAA+PROBE: NEGATIVE
HPV18 DNA SPEC QL NAA+PROBE: NEGATIVE
SPECIMEN DESCRIPTION: NORMAL
SPECIMEN SOURCE CVX/VAG CYTO: NORMAL

## 2021-06-24 ENCOUNTER — OFFICE VISIT (OUTPATIENT)
Dept: OBGYN | Facility: CLINIC | Age: 31
End: 2021-06-24
Payer: COMMERCIAL

## 2021-06-24 VITALS — OXYGEN SATURATION: 100 % | DIASTOLIC BLOOD PRESSURE: 78 MMHG | HEART RATE: 89 BPM | SYSTOLIC BLOOD PRESSURE: 123 MMHG

## 2021-06-24 DIAGNOSIS — Z30.432 ENCOUNTER FOR REMOVAL OF INTRAUTERINE CONTRACEPTIVE DEVICE: Primary | ICD-10-CM

## 2021-06-24 PROBLEM — D56.9 THALASSEMIA: Status: ACTIVE | Noted: 2021-06-24

## 2021-06-24 PROCEDURE — 58301 REMOVE INTRAUTERINE DEVICE: CPT | Performed by: OBSTETRICS & GYNECOLOGY

## 2021-06-24 SDOH — HEALTH STABILITY: MENTAL HEALTH: HOW OFTEN DO YOU HAVE A DRINK CONTAINING ALCOHOL?: NEVER

## 2021-06-24 SDOH — ECONOMIC STABILITY: FOOD INSECURITY: WITHIN THE PAST 12 MONTHS, THE FOOD YOU BOUGHT JUST DIDN'T LAST AND YOU DIDN'T HAVE MONEY TO GET MORE.: NOT ASKED

## 2021-06-24 SDOH — ECONOMIC STABILITY: TRANSPORTATION INSECURITY
IN THE PAST 12 MONTHS, HAS LACK OF TRANSPORTATION KEPT YOU FROM MEETINGS, WORK, OR FROM GETTING THINGS NEEDED FOR DAILY LIVING?: NOT ASKED

## 2021-06-24 SDOH — HEALTH STABILITY: MENTAL HEALTH: HOW OFTEN DO YOU HAVE 6 OR MORE DRINKS ON ONE OCCASION?: NEVER

## 2021-06-24 SDOH — ECONOMIC STABILITY: TRANSPORTATION INSECURITY
IN THE PAST 12 MONTHS, HAS THE LACK OF TRANSPORTATION KEPT YOU FROM MEDICAL APPOINTMENTS OR FROM GETTING MEDICATIONS?: NOT ASKED

## 2021-06-24 SDOH — ECONOMIC STABILITY: FOOD INSECURITY: WITHIN THE PAST 12 MONTHS, YOU WORRIED THAT YOUR FOOD WOULD RUN OUT BEFORE YOU GOT MONEY TO BUY MORE.: NOT ASKED

## 2021-06-24 SDOH — HEALTH STABILITY: MENTAL HEALTH: HOW MANY STANDARD DRINKS CONTAINING ALCOHOL DO YOU HAVE ON A TYPICAL DAY?: NOT ASKED

## 2021-06-24 SDOH — ECONOMIC STABILITY: INCOME INSECURITY: HOW HARD IS IT FOR YOU TO PAY FOR THE VERY BASICS LIKE FOOD, HOUSING, MEDICAL CARE, AND HEATING?: NOT ASKED

## 2021-06-24 NOTE — PATIENT INSTRUCTIONS
If you have any questions regarding your visit, Please contact your care team.     [facility] [facility]: [phone]  Women s Health CLINIC HOURS TELEPHONE NUMBER       Ron Gregory M.D.    Sri-JUAN M Sanabria-JUAN M Manuel-Medical Assistant    Miriam-  Jenelle-     Monday-[place]  8:00a.m-4:45 p.m  Tuesday-[place]  9:00a.m-4:00 p.m  Wednesday-[place] 8:00a.m-4:45 p.m.  Thursday-[place]  8:00a.m-4:45 p.m.  Friday-[place]  8:00a.m-4:45 p.m. [facility]  [address] [place] [id number] [phone] ask for [facility]  [phone] Fax  Imaging [phone]    [facility]  [address]  [place], [place] [address] [phone]    [facility]  [address] [place]  [place] [address] [phone] ask [facility]  [phone] Fax  Imaging [phone]     Urgent Care locations:    [place]        [place] Monday-Friday  5 pm - 9 pm  Saturday and Sunday   9 am - 5 pm  Monday-Friday   11 am - 9 pm  Saturday and Sunday   9 am - 5 pm   [phone]   If you need a medication refill, please contact your pharmacy. Please allow 3 business days for your refill to be completed.  As always, Thank you for trusting us with your healthcare needs!

## 2021-06-24 NOTE — PROGRESS NOTES
OB-GYN Problem-Oriented Visit or Consultation      Sandra Chavez is a 31 year old year old P 1 who presents with a chief complaint of IUD removal.  Referred by Britney Mcgraw NP.  No LMP recorded. (Menstrual status: IUD).    Assessment:   Encounter Diagnoses   Name Primary?     Encounter for removal of intrauterine contraceptive device Yes         Plan and Recommendations:   I reviewed the condition, causes, differential diagnosis, prognosis, evaluation and management considerations and options.  Questions answered and information given. See orders.   Preconception counseling reviewed as needed including cycle timing and optimization, medical status, meds, vaccines, exposures, nutrition, folic acid, family history, genetic screening (CF carrier scr, etc.), social issues and any applicable specific risk factors.   30 minutes spent on the date of encounter doing chart review, history, examination, discussion with patient, and documentation, and further activities as noted, and review of appropriateness of decision-making for care, and review of test results, and interpretation of test results, and review of outside records. This includes evaluation and management time spent beyond specific discussion of and performance of the procedure itself.         A/P:  Sandra was seen today for iud.    Diagnoses and all orders for this visit:    Encounter for removal of intrauterine contraceptive device  -     REMOVE INTRAUTERINE DEVICE        Ron Gregory MD    HPI:     Plans another pregnancy and desires IUD removal but strings were missing. Menses suppressed. No pelvic symptoms. Has a history of beta thalassemia intermedia and also recent iron deficiency also.     Past medical, obstetrical, surgical, family and social history reviewed and as noted or updated in chart.     Allergies, meds and supplements are as noted or updated in chart.      ROS:   Systems reviewed include:  constitutional, genitourinary, psychological,  hematologic/lymphatic and endocrine.    These systems were negative for significant symptoms except for the following additional: none; see HPI.    EXAM:  VS as noted. /78 (BP Location: Left arm, Patient Position: Chair, Cuff Size: Adult Regular)   Pulse 89   SpO2 100%   Breastfeeding No   Constitutionally normal.     Pelvis is  normal or negative except for, or in particular noting, the following  pertinent findings: uterus anterior, IUD strings not seen.       PROCEDURE: IUD Removal    I discussed the removal procedure, objectives, risks, complications and future contraceptive methods as indicated.  Patient understood and desired to proceed.    After appropriate preparation, the IUD strings were found in the distal endocervical canal and the Mirena IUD was removed intact. No complications. Patient tolerated the procedure well. Stable at discharge. Instructions and information were given.    Ron Gregory MD

## 2021-08-12 ENCOUNTER — LAB REQUISITION (OUTPATIENT)
Dept: LAB | Facility: CLINIC | Age: 31
End: 2021-08-12

## 2021-08-13 PROCEDURE — U0005 INFEC AGEN DETEC AMPLI PROBE: HCPCS | Performed by: FAMILY MEDICINE

## 2021-08-14 LAB — SARS-COV-2 RNA RESP QL NAA+PROBE: NEGATIVE

## 2021-08-16 ENCOUNTER — LAB REQUISITION (OUTPATIENT)
Dept: LAB | Facility: CLINIC | Age: 31
End: 2021-08-16

## 2021-08-18 PROCEDURE — U0003 INFECTIOUS AGENT DETECTION BY NUCLEIC ACID (DNA OR RNA); SEVERE ACUTE RESPIRATORY SYNDROME CORONAVIRUS 2 (SARS-COV-2) (CORONAVIRUS DISEASE [COVID-19]), AMPLIFIED PROBE TECHNIQUE, MAKING USE OF HIGH THROUGHPUT TECHNOLOGIES AS DESCRIBED BY CMS-2020-01-R: HCPCS | Performed by: FAMILY MEDICINE

## 2021-08-19 ENCOUNTER — LAB REQUISITION (OUTPATIENT)
Dept: LAB | Facility: CLINIC | Age: 31
End: 2021-08-19

## 2021-08-23 PROCEDURE — U0005 INFEC AGEN DETEC AMPLI PROBE: HCPCS | Performed by: FAMILY MEDICINE

## 2021-08-24 ENCOUNTER — LAB REQUISITION (OUTPATIENT)
Dept: LAB | Facility: CLINIC | Age: 31
End: 2021-08-24

## 2021-08-24 ENCOUNTER — PRENATAL OFFICE VISIT (OUTPATIENT)
Dept: OBGYN | Facility: CLINIC | Age: 31
End: 2021-08-24
Payer: COMMERCIAL

## 2021-08-24 DIAGNOSIS — Z23 NEED FOR TDAP VACCINATION: ICD-10-CM

## 2021-08-24 DIAGNOSIS — Z53.9 ERRONEOUS ENCOUNTER--DISREGARD: Primary | ICD-10-CM

## 2021-08-24 LAB — SARS-COV-2 RNA RESP QL NAA+PROBE: NEGATIVE

## 2021-08-24 NOTE — PROGRESS NOTES
RN called pt. Pt wanting to see [facility] for her prenatal care. This appt is not needed. Patient verbalized understanding and agreed to plan.   Patient was given the opportunity to ask additional questions and have them answered. Patient had no further questions.     Sri Lee RN on 8/24/2021 at 2:04 PM    This encounter was opened in error. Please disregard.

## 2021-08-25 LAB — SARS-COV-2 RNA RESP QL NAA+PROBE: NEGATIVE

## 2021-08-26 ENCOUNTER — LAB REQUISITION (OUTPATIENT)
Dept: LAB | Facility: CLINIC | Age: 31
End: 2021-08-26

## 2021-08-26 PROCEDURE — U0003 INFECTIOUS AGENT DETECTION BY NUCLEIC ACID (DNA OR RNA); SEVERE ACUTE RESPIRATORY SYNDROME CORONAVIRUS 2 (SARS-COV-2) (CORONAVIRUS DISEASE [COVID-19]), AMPLIFIED PROBE TECHNIQUE, MAKING USE OF HIGH THROUGHPUT TECHNOLOGIES AS DESCRIBED BY CMS-2020-01-R: HCPCS | Performed by: FAMILY MEDICINE

## 2021-08-28 LAB — SARS-COV-2 RNA RESP QL NAA+PROBE: NEGATIVE

## 2021-08-30 PROCEDURE — U0003 INFECTIOUS AGENT DETECTION BY NUCLEIC ACID (DNA OR RNA); SEVERE ACUTE RESPIRATORY SYNDROME CORONAVIRUS 2 (SARS-COV-2) (CORONAVIRUS DISEASE [COVID-19]), AMPLIFIED PROBE TECHNIQUE, MAKING USE OF HIGH THROUGHPUT TECHNOLOGIES AS DESCRIBED BY CMS-2020-01-R: HCPCS | Performed by: FAMILY MEDICINE

## 2021-08-31 LAB — SARS-COV-2 RNA RESP QL NAA+PROBE: NEGATIVE

## 2021-09-03 ENCOUNTER — LAB REQUISITION (OUTPATIENT)
Dept: LAB | Facility: CLINIC | Age: 31
End: 2021-09-03

## 2021-09-07 PROCEDURE — U0005 INFEC AGEN DETEC AMPLI PROBE: HCPCS | Performed by: FAMILY MEDICINE

## 2021-09-08 LAB — SARS-COV-2 RNA RESP QL NAA+PROBE: NEGATIVE

## 2021-09-13 ENCOUNTER — LAB REQUISITION (OUTPATIENT)
Dept: LAB | Facility: CLINIC | Age: 31
End: 2021-09-13

## 2021-09-14 PROCEDURE — U0003 INFECTIOUS AGENT DETECTION BY NUCLEIC ACID (DNA OR RNA); SEVERE ACUTE RESPIRATORY SYNDROME CORONAVIRUS 2 (SARS-COV-2) (CORONAVIRUS DISEASE [COVID-19]), AMPLIFIED PROBE TECHNIQUE, MAKING USE OF HIGH THROUGHPUT TECHNOLOGIES AS DESCRIBED BY CMS-2020-01-R: HCPCS | Performed by: FAMILY MEDICINE

## 2021-09-15 LAB — SARS-COV-2 RNA RESP QL NAA+PROBE: NEGATIVE

## 2021-09-20 ENCOUNTER — LAB REQUISITION (OUTPATIENT)
Dept: LAB | Facility: CLINIC | Age: 31
End: 2021-09-20

## 2021-09-21 PROCEDURE — U0005 INFEC AGEN DETEC AMPLI PROBE: HCPCS | Performed by: FAMILY MEDICINE

## 2021-09-22 ENCOUNTER — LAB REQUISITION (OUTPATIENT)
Dept: LAB | Facility: CLINIC | Age: 31
End: 2021-09-22

## 2021-09-22 LAB — SARS-COV-2 RNA RESP QL NAA+PROBE: NEGATIVE

## 2021-09-24 PROCEDURE — U0005 INFEC AGEN DETEC AMPLI PROBE: HCPCS | Performed by: FAMILY MEDICINE

## 2021-09-26 LAB — SARS-COV-2 RNA RESP QL NAA+PROBE: NEGATIVE

## 2021-10-07 NOTE — TELEPHONE ENCOUNTER
October 7, 2021  The envelope was not picked up from the . Placed in shred box.  Kendy Schwarz [facility]  2nd Floor  Primary Care

## 2021-10-09 ENCOUNTER — HEALTH MAINTENANCE LETTER (OUTPATIENT)
Age: 31
End: 2021-10-09

## 2022-09-11 ENCOUNTER — HEALTH MAINTENANCE LETTER (OUTPATIENT)
Age: 32
End: 2022-09-11

## 2023-10-07 ENCOUNTER — HEALTH MAINTENANCE LETTER (OUTPATIENT)
Age: 33
End: 2023-10-07